# Patient Record
Sex: FEMALE | Race: WHITE | NOT HISPANIC OR LATINO | Employment: FULL TIME | URBAN - METROPOLITAN AREA
[De-identification: names, ages, dates, MRNs, and addresses within clinical notes are randomized per-mention and may not be internally consistent; named-entity substitution may affect disease eponyms.]

---

## 2020-02-10 ENCOUNTER — HOSPITAL ENCOUNTER (EMERGENCY)
Facility: MEDICAL CENTER | Age: 40
End: 2020-02-10
Attending: EMERGENCY MEDICINE
Payer: COMMERCIAL

## 2020-02-10 ENCOUNTER — APPOINTMENT (OUTPATIENT)
Dept: RADIOLOGY | Facility: MEDICAL CENTER | Age: 40
End: 2020-02-10
Attending: EMERGENCY MEDICINE
Payer: COMMERCIAL

## 2020-02-10 VITALS
OXYGEN SATURATION: 97 % | HEIGHT: 65 IN | TEMPERATURE: 97.7 F | DIASTOLIC BLOOD PRESSURE: 68 MMHG | RESPIRATION RATE: 18 BRPM | SYSTOLIC BLOOD PRESSURE: 113 MMHG | BODY MASS INDEX: 33.61 KG/M2 | HEART RATE: 88 BPM | WEIGHT: 201.72 LBS

## 2020-02-10 DIAGNOSIS — R10.84 GENERALIZED ABDOMINAL PAIN: ICD-10-CM

## 2020-02-10 LAB
ALBUMIN SERPL BCP-MCNC: 4 G/DL (ref 3.2–4.9)
ALBUMIN/GLOB SERPL: 1.7 G/DL
ALP SERPL-CCNC: 54 U/L (ref 30–99)
ALT SERPL-CCNC: 15 U/L (ref 2–50)
ANION GAP SERPL CALC-SCNC: 12 MMOL/L (ref 0–11.9)
AST SERPL-CCNC: 17 U/L (ref 12–45)
BASOPHILS # BLD AUTO: 0.5 % (ref 0–1.8)
BASOPHILS # BLD: 0.07 K/UL (ref 0–0.12)
BILIRUB SERPL-MCNC: <0.2 MG/DL (ref 0.1–1.5)
BUN SERPL-MCNC: 15 MG/DL (ref 8–22)
CALCIUM SERPL-MCNC: 8.5 MG/DL (ref 8.4–10.2)
CHLORIDE SERPL-SCNC: 105 MMOL/L (ref 96–112)
CO2 SERPL-SCNC: 21 MMOL/L (ref 20–33)
CREAT SERPL-MCNC: 0.69 MG/DL (ref 0.5–1.4)
EOSINOPHIL # BLD AUTO: 1.26 K/UL (ref 0–0.51)
EOSINOPHIL NFR BLD: 9.9 % (ref 0–6.9)
ERYTHROCYTE [DISTWIDTH] IN BLOOD BY AUTOMATED COUNT: 42 FL (ref 35.9–50)
GLOBULIN SER CALC-MCNC: 2.3 G/DL (ref 1.9–3.5)
GLUCOSE SERPL-MCNC: 104 MG/DL (ref 65–99)
HCT VFR BLD AUTO: 40.6 % (ref 37–47)
HGB BLD-MCNC: 13.5 G/DL (ref 12–16)
IMM GRANULOCYTES # BLD AUTO: 0.06 K/UL (ref 0–0.11)
IMM GRANULOCYTES NFR BLD AUTO: 0.5 % (ref 0–0.9)
LIPASE SERPL-CCNC: 34 U/L (ref 7–58)
LYMPHOCYTES # BLD AUTO: 2.39 K/UL (ref 1–4.8)
LYMPHOCYTES NFR BLD: 18.8 % (ref 22–41)
MCH RBC QN AUTO: 30.7 PG (ref 27–33)
MCHC RBC AUTO-ENTMCNC: 33.3 G/DL (ref 33.6–35)
MCV RBC AUTO: 92.3 FL (ref 81.4–97.8)
MONOCYTES # BLD AUTO: 0.61 K/UL (ref 0–0.85)
MONOCYTES NFR BLD AUTO: 4.8 % (ref 0–13.4)
NEUTROPHILS # BLD AUTO: 8.34 K/UL (ref 2–7.15)
NEUTROPHILS NFR BLD: 65.5 % (ref 44–72)
NRBC # BLD AUTO: 0 K/UL
NRBC BLD-RTO: 0 /100 WBC
PLATELET # BLD AUTO: 249 K/UL (ref 164–446)
PMV BLD AUTO: 10.5 FL (ref 9–12.9)
POTASSIUM SERPL-SCNC: 3.8 MMOL/L (ref 3.6–5.5)
PROT SERPL-MCNC: 6.3 G/DL (ref 6–8.2)
RBC # BLD AUTO: 4.4 M/UL (ref 4.2–5.4)
SODIUM SERPL-SCNC: 138 MMOL/L (ref 135–145)
WBC # BLD AUTO: 12.7 K/UL (ref 4.8–10.8)

## 2020-02-10 PROCEDURE — 99284 EMERGENCY DEPT VISIT MOD MDM: CPT

## 2020-02-10 PROCEDURE — 76705 ECHO EXAM OF ABDOMEN: CPT

## 2020-02-10 PROCEDURE — 36415 COLL VENOUS BLD VENIPUNCTURE: CPT

## 2020-02-10 PROCEDURE — 85025 COMPLETE CBC W/AUTO DIFF WBC: CPT

## 2020-02-10 PROCEDURE — 80053 COMPREHEN METABOLIC PANEL: CPT

## 2020-02-10 PROCEDURE — 83690 ASSAY OF LIPASE: CPT

## 2020-02-10 NOTE — ED TRIAGE NOTES
"Chief Complaint   Patient presents with   • Abdominal Pain     x1 week       /77   Pulse 93   Temp 36.5 °C (97.7 °F) (Temporal)   Ht 1.651 m (5' 5\")   Wt 91.5 kg (201 lb 11.5 oz)   LMP 01/27/2020   BMI 33.57 kg/m²     Pt denies nausea/vomiting  "

## 2020-02-10 NOTE — ED NOTES
Plan of care reviewed with pt and spouse. saline lock with blood draw at this time. States pain 2/10 currently. Aware of pending u/s-call light in reach, will notify staff of chgs in status

## 2020-02-10 NOTE — ED PROVIDER NOTES
ED Provider Note    CHIEF COMPLAINT  Chief Complaint   Patient presents with   • Abdominal Pain     x1 week       HPI  Tiarra Cross is a 39 y.o. female who presents to the emergency department with abdominal pain.  The patient localizes the pain to an area just above her bellybutton.  She says that she is had pain for the last week.  The pain seems to come and go.  Seems to be worse at night.  Not necessarily associated with ingestion of food.  She describes as a sharp stabbing crampy type pain.  Always located in the supraumbilical region.  No vomiting.  No diarrhea.  No constipation.    REVIEW OF SYSTEMS  See HPI for further details. All other systems are negative.     PAST MEDICAL HISTORY  History reviewed. No pertinent past medical history.    FAMILY HISTORY  History reviewed. No pertinent family history.    SOCIAL HISTORY  Social History     Socioeconomic History   • Marital status:      Spouse name: Not on file   • Number of children: Not on file   • Years of education: Not on file   • Highest education level: Not on file   Occupational History   • Not on file   Social Needs   • Financial resource strain: Not on file   • Food insecurity:     Worry: Not on file     Inability: Not on file   • Transportation needs:     Medical: Not on file     Non-medical: Not on file   Tobacco Use   • Smoking status: Never Smoker   • Smokeless tobacco: Never Used   Substance and Sexual Activity   • Alcohol use: Not Currently     Comment: occas.   • Drug use: No   • Sexual activity: Not on file   Lifestyle   • Physical activity:     Days per week: Not on file     Minutes per session: Not on file   • Stress: Not on file   Relationships   • Social connections:     Talks on phone: Not on file     Gets together: Not on file     Attends [employer] service: Not on file     Active member of club or organization: Not on file     Attends meetings of clubs or organizations: Not on file     Relationship status: Not on file   •  "Intimate partner violence:     Fear of current or ex partner: Not on file     Emotionally abused: Not on file     Physically abused: Not on file     Forced sexual activity: Not on file   Other Topics Concern   • Not on file   Social History Narrative   • Not on file       SURGICAL HISTORY  Past Surgical History:   Procedure Laterality Date   • DILATION AND CURETTAGE         CURRENT MEDICATIONS  Home Medications    **Home medications have not yet been reviewed for this encounter**         ALLERGIES  No Known Allergies    PHYSICAL EXAM  VITAL SIGNS: /68   Pulse 88   Temp 36.5 °C (97.7 °F) (Temporal)   Resp 18   Ht 1.651 m (5' 5\")   Wt 91.5 kg (201 lb 11.5 oz)   LMP 01/27/2020   SpO2 97%   BMI 33.57 kg/m²   Constitutional: Well developed, Well nourished, no acute distress, Non-toxic appearance.   HENT: Normocephalic, Atraumatic, Bilateral external ears normal, Oropharynx moist, No oral exudates, Nose normal.   Eyes: PERRL, EOMI, Conjunctiva normal, No discharge.   Neck: Normal range of motion, No tenderness, Supple, No stridor.   Lymphatic: No lymphadenopathy noted.   Cardiovascular: Normal heart rate, Normal rhythm, No murmurs, No rubs, No gallops.   Thorax & Lungs: Normal breath sounds, No respiratory distress, No wheezing, No chest tenderness.   Abdomen: Mild tenderness to palpation just above the umbilicus in the lower epigastrium.  No palpable masses.  No palpable hernias.  Negative De Los Santos sign.  No tenderness at McBurney's point.  Skin: Warm, Dry, No erythema, No rash.   Back: No tenderness, No CVA tenderness.   Extremities: Intact distal pulses, No edema, No tenderness, No cyanosis, No clubbing.   Neurologic: Alert & oriented x 3, Normal motor function, Normal sensory function, No focal deficits noted.       RADIOLOGY/PROCEDURES  US-RUQ   Final Result      Inhomogeneous liver is a nonspecific finding. There is no enlargement or mass identified      Upper normal pancreas duct caliber. Consider " correlation with pancreatic enzymes        Results for orders placed or performed during the hospital encounter of 02/10/20   CBC WITH DIFFERENTIAL   Result Value Ref Range    WBC 12.7 (H) 4.8 - 10.8 K/uL    RBC 4.40 4.20 - 5.40 M/uL    Hemoglobin 13.5 12.0 - 16.0 g/dL    Hematocrit 40.6 37.0 - 47.0 %    MCV 92.3 81.4 - 97.8 fL    MCH 30.7 27.0 - 33.0 pg    MCHC 33.3 (L) 33.6 - 35.0 g/dL    RDW 42.0 35.9 - 50.0 fL    Platelet Count 249 164 - 446 K/uL    MPV 10.5 9.0 - 12.9 fL    Neutrophils-Polys 65.50 44.00 - 72.00 %    Lymphocytes 18.80 (L) 22.00 - 41.00 %    Monocytes 4.80 0.00 - 13.40 %    Eosinophils 9.90 (H) 0.00 - 6.90 %    Basophils 0.50 0.00 - 1.80 %    Immature Granulocytes 0.50 0.00 - 0.90 %    Nucleated RBC 0.00 /100 WBC    Neutrophils (Absolute) 8.34 (H) 2.00 - 7.15 K/uL    Lymphs (Absolute) 2.39 1.00 - 4.80 K/uL    Monos (Absolute) 0.61 0.00 - 0.85 K/uL    Eos (Absolute) 1.26 (H) 0.00 - 0.51 K/uL    Baso (Absolute) 0.07 0.00 - 0.12 K/uL    Immature Granulocytes (abs) 0.06 0.00 - 0.11 K/uL    NRBC (Absolute) 0.00 K/uL   COMP METABOLIC PANEL   Result Value Ref Range    Sodium 138 135 - 145 mmol/L    Potassium 3.8 3.6 - 5.5 mmol/L    Chloride 105 96 - 112 mmol/L    Co2 21 20 - 33 mmol/L    Anion Gap 12.0 (H) 0.0 - 11.9    Glucose 104 (H) 65 - 99 mg/dL    Bun 15 8 - 22 mg/dL    Creatinine 0.69 0.50 - 1.40 mg/dL    Calcium 8.5 8.4 - 10.2 mg/dL    AST(SGOT) 17 12 - 45 U/L    ALT(SGPT) 15 2 - 50 U/L    Alkaline Phosphatase 54 30 - 99 U/L    Total Bilirubin <0.2 0.1 - 1.5 mg/dL    Albumin 4.0 3.2 - 4.9 g/dL    Total Protein 6.3 6.0 - 8.2 g/dL    Globulin 2.3 1.9 - 3.5 g/dL    A-G Ratio 1.7 g/dL   LIPASE   Result Value Ref Range    Lipase 34 7 - 58 U/L   ESTIMATED GFR   Result Value Ref Range    GFR If African American >60 >60 mL/min/1.73 m 2    GFR If Non African American >60 >60 mL/min/1.73 m 2         COURSE & MEDICAL DECISION MAKING  Pertinent Labs & Imaging studies reviewed. (See chart for details)    The  patient presents today with abdominal pain.  Fairly nonspecific physical examination.  Patient has had intermittent abdominal pain over the last week.  Seems to be improved at this time.  She is lost over 40 pounds as she is been trying to lose weight recently.  Patient may have gallstones causing biliary colic.  She does not have tenderness in the lower abdomen to make me concerned for appendicitis, or ovarian pathology.    Laboratory studies are remarkable for a mildly elevated white blood cell count.  Liver function tests are normal.  Lipase is normal ruling out pancreatitis.    Ultrasound is obtained.  This shows no evidence of cholecystitis or cholelithiasis.  Pancreatic duct is mildly dilated but the patient has a normal lipase.  On reexamination I reviewed all the patient's laboratory findings and ultrasound findings.  Repeat abdominal exam is entirely benign.  At this point I feel that her work-up is reassuring.  I do not feel that further work-up or CT scan would be beneficial at this time.  However I encouraged the patient to return to the emergency department should she develop any worsening pain, fevers, vomiting, or new symptoms.  The patient and her  verbalized understanding.  Discharged home in stable condition.    The patient will return for new or worsening symptoms and is stable at the time of discharge.    The patient is referred to a primary physician for blood pressure management, diabetic screening, and for all other preventative health concerns.    DISPOSITION:  Patient will be discharged home in stable condition.    FOLLOW UP:  Toro SANTOS M.D.  [address] [id number] [phone]    Schedule an appointment as soon as possible for a visit       [facility], Emergency Dept  [address] [id number] [phone]  Schedule an appointment as soon as possible for a visit         OUTPATIENT MEDICATIONS:  Discharge  Medication List as of 2/10/2020  4:39 PM            FINAL IMPRESSION  1. Generalized abdominal pain            Electronically signed by: Kenji Garza M.D., 2/10/2020 5:33 PM

## 2020-02-11 NOTE — ED NOTES
Pt rounding upon return from u/s. In no apparent distress, vs as charted, call light in reach, denies needs

## 2020-02-11 NOTE — ED NOTES
Patient educated on discharge instructions, verbalized understanding. IV removed, bleeding controlled. Patient ambulated steadily and independently from ED, belongings w patient.

## 2020-10-10 ENCOUNTER — HOSPITAL ENCOUNTER (EMERGENCY)
Facility: MEDICAL CENTER | Age: 40
End: 2020-10-10
Attending: EMERGENCY MEDICINE
Payer: COMMERCIAL

## 2020-10-10 VITALS
OXYGEN SATURATION: 93 % | WEIGHT: 200 LBS | RESPIRATION RATE: 50 BRPM | HEIGHT: 65 IN | BODY MASS INDEX: 33.32 KG/M2 | SYSTOLIC BLOOD PRESSURE: 131 MMHG | DIASTOLIC BLOOD PRESSURE: 76 MMHG | TEMPERATURE: 97.9 F | HEART RATE: 106 BPM

## 2020-10-10 DIAGNOSIS — T78.2XXA ANAPHYLAXIS, INITIAL ENCOUNTER: ICD-10-CM

## 2020-10-10 DIAGNOSIS — T78.40XA ALLERGIC REACTION, INITIAL ENCOUNTER: ICD-10-CM

## 2020-10-10 PROCEDURE — 96372 THER/PROPH/DIAG INJ SC/IM: CPT

## 2020-10-10 PROCEDURE — 99283 EMERGENCY DEPT VISIT LOW MDM: CPT

## 2020-10-10 PROCEDURE — 700111 HCHG RX REV CODE 636 W/ 250 OVERRIDE (IP): Performed by: EMERGENCY MEDICINE

## 2020-10-10 RX ORDER — EPINEPHRINE 1 MG/ML(1)
0.5 AMPUL (ML) INJECTION ONCE
Status: COMPLETED | OUTPATIENT
Start: 2020-10-10 | End: 2020-10-10

## 2020-10-10 RX ADMIN — EPINEPHRINE 0.5 MG: 1 INJECTION INTRAMUSCULAR; INTRAVENOUS; SUBCUTANEOUS at 20:46

## 2020-10-10 ASSESSMENT — FIBROSIS 4 INDEX: FIB4 SCORE: 0.74

## 2020-10-11 NOTE — ED TRIAGE NOTES
Pt presents with  from home for allergic reaction. Reports it began at 0400 this morning and progressively worsening over the day. Started on scalp. No having hives across body and facial swelling. Denies SOB or swelling in throat. Took 225 mg of benadryl over the day and 20 mg of prednisone today after speaking with her primary care. Pt a&Ox4 and ambulatory.     Patient masked. No respiratory symptoms, no recent travel, denies known COVID exposure.

## 2020-10-11 NOTE — ED NOTES
Pt presents to ED for allergic reaction to unknown substance. Pt reports that the reaction started at roughly 0400 with hives and over the day has progressively gotten worse. Pt reports taking benadryl and medications at home without relief. Airway intact and maintainable by pt. Denies any SOB. Pt notes that the swelling started to go into the facial region, and mouth/throat at roughly 1600 this afternoon. Prior to today, pt had no reaction, or no allergies to note. A/Ox4.  at bedside.

## 2020-10-11 NOTE — ED NOTES
Pt discharged home per provider in stable condition. Paperwork provided to pt via RN and discharge instructions went over with by RN - pt verbalized understanding of teaching with no questions or concerns and is A/Ox4, VSS. Paperwork in hand on discharge.    Prescription in hand no questions or concerns.

## 2020-10-11 NOTE — ED PROVIDER NOTES
ED Provider Note    CHIEF COMPLAINT  Chief Complaint   Patient presents with   • Allergic Reaction   • Facial Swelling       HPI  Tiarra Cross is a 40 y.o. female who presents with diffuse hives starting about an hour ago.  She states that she awoke this morning at around 4 AM with some itchiness to the scalp.  She noted some swelling to her right upper lip and around her right eye.  Since this morning, she has developed worsening itchiness and swelling especially in the face including bilateral eyes and perioral region.  No intraoral swelling.  No difficulty with breathing or speaking.  Has diffuse pruritus and diffuse hives.  Denies prior history of allergic reaction/anaphylaxis.  No new food or cosmetic exposures.  She states that she has just finished 1/5-day of Macrobid for treatment of a UTI.  Denies any urinary tract infection symptoms such as dysuria, flank pain, vomiting.  No fevers.  No shortness of breath.  No wheezing.    The patient has taken 225 mg of Benadryl throughout the day today without improvement.  She also took 20 mg of prednisone earlier today as well.  She has never received care for allergic reaction before in the past.    REVIEW OF SYSTEMS  See HPI for further details. All other systems are negative.     PAST MEDICAL HISTORY   Denies a from past medical history.    SOCIAL HISTORY  Social History     Tobacco Use   • Smoking status: Never Smoker   • Smokeless tobacco: Never Used   Substance and Sexual Activity   • Alcohol use: Not Currently     Comment: occas.   • Drug use: No   • Sexual activity: Not on file       SURGICAL HISTORY   has a past surgical history that includes dilation and curettage.    CURRENT MEDICATIONS  Home Medications     Reviewed by Nanda Ruff R.N. (Registered Nurse) on 10/10/20 at 2013  Med List Status: Not Addressed   Medication Last Dose Status   albuterol (VENTOLIN OR PROVENTIL) 108 (90 BASE) MCG/ACT AERS  Active   azithromycin (ZITHROMAX) 250 MG TABS   "Active   cyclobenzaprine (FLEXERIL) 10 MG TABS  Active   diclofenac EC (VOLTAREN) 75 MG TBEC  Active   Etonogestrel-Ethinyl Estradiol (NUVARING VA)  Active   hydrocodone/acetaminophen (NORCO)  MG TABS  Active   ofloxacin otic sol (FLOXIN OTIC) 0.3 % SOLN  Active   oxycodone-acetaminophen (PERCOCET) 5-325 MG TABS  Active   pregabalin (LYRICA) 300 MG capsule  Active                ALLERGIES  No Known Allergies    PHYSICAL EXAM  VITAL SIGNS: /80   Temp 36.6 °C (97.9 °F) (Temporal)   Resp 20   Ht 1.651 m (5' 5\")   Wt 90.7 kg (200 lb)   LMP 10/10/2020   SpO2 95%   BMI 33.28 kg/m²   Pulse ox interpretation: I interpret this pulse ox as normal.  Constitutional: Alert in no apparent distress.  HENT: No signs of trauma, Bilateral external ears normal, Nose normal.  Nicki-oral edema, bilateral periorbital edema  Eyes: Pupils are equal and reactive, Conjunctiva normal, Non-icteric.   Neck: Normal range of motion, No tenderness, Supple, No stridor.   Cardiovascular: Regular rate and rhythm.   Thorax & Lungs: Normal breath sounds, No respiratory distress, No wheezing, No chest tenderness.   Skin: Warm, Dry, No erythema, diffuse urticarial rash on extremities and torso.   Back: No bony tenderness, No CVA tenderness.   Extremities: Intact distal pulses, No edema, No tenderness, No cyanosis  Neurologic: Alert, No focal deficits noted.       DIAGNOSTIC STUDIES / PROCEDURES      COURSE & MEDICAL DECISION MAKING    Medications   EPINEPHrine injection 0.5 mg (has no administration in time range)       Pertinent Labs & Imaging studies reviewed. (See chart for details)  40 y.o. female presenting with facial swelling, diffuse pruritic urticaria.  The only new exposure is Macrobid over the past 5 days which she took for a UTI.  Denies any UTI symptoms.  No fevers, vomiting.  No abdominal pain.    Patient was given intramuscular epinephrine due to concerns for anaphylaxis given the patient's facial swelling especially in " "the perioral region.  No stridor or respiratory distress.  No wheezing..  She had several doses of Benadryl at home earlier today without much improvement.  She also took 1 dose of prednisone.    The patient denies prior history of anaphylaxis or significant allergic response in the past.    Following epinephrine administration, the patient had resolution of urticarial rash and pruritus.  Had improvement in facial swelling as well.  I reevaluated the patient at bedside and she states that she feels comfortable with discharge in her current state.  No wheezing or respiratory distress.  No change in her voice.    Patient is slightly tachycardic prior to discharge however I suspect that is related to the administration of epinephrine earlier.  She was discharged home with EpiPen's.  She states that she will follow-up with an allergy specialist.  She was instructed not to take her last dose of Macrobid for her urinary tract infection as I do suspect that this is the culprit exposure that caused her anaphylactic reaction.    The patient was instructed to follow-up with primary care physician for further management.  To return immediately for any worsening symptoms or development of any other concerning signs or symptoms. The patient verbalizes understanding in their own words.    /76   Pulse (!) 106   Temp 36.6 °C (97.9 °F) (Temporal)   Resp 19  Ht 1.651 m (5' 5\")   Wt 90.7 kg (200 lb)   LMP 10/10/2020   SpO2 93%   BMI 33.28 kg/m²     The patient was referred to primary care where they will receive further BP management.      [facility], Emergency Dept  [address]  [facility] [id number]  [phone]    As needed, If symptoms worsen    Primary care doctor    Schedule an appointment as soon as possible for a visit         FINAL IMPRESSION  1. Allergic reaction, initial encounter    2. Anaphylaxis, initial encounter            Electronically signed by: Dwayne Mendez M.D., " 10/10/2020 8:24 PM

## 2020-11-05 ENCOUNTER — TELEMEDICINE (OUTPATIENT)
Dept: MEDICAL GROUP | Facility: MEDICAL CENTER | Age: 40
End: 2020-11-05
Payer: COMMERCIAL

## 2020-11-05 VITALS — BODY MASS INDEX: 35.65 KG/M2 | HEIGHT: 65 IN | WEIGHT: 214 LBS | HEART RATE: 100 BPM

## 2020-11-05 DIAGNOSIS — Z71.3 ENCOUNTER FOR WEIGHT LOSS COUNSELING: ICD-10-CM

## 2020-11-05 DIAGNOSIS — T78.2XXD ANAPHYLAXIS, SUBSEQUENT ENCOUNTER: ICD-10-CM

## 2020-11-05 DIAGNOSIS — Z12.31 ENCOUNTER FOR SCREENING MAMMOGRAM FOR BREAST CANCER: ICD-10-CM

## 2020-11-05 DIAGNOSIS — J45.20 MILD INTERMITTENT ASTHMA WITHOUT COMPLICATION: ICD-10-CM

## 2020-11-05 DIAGNOSIS — Z00.00 PREVENTATIVE HEALTH CARE: ICD-10-CM

## 2020-11-05 PROBLEM — T78.2XXA ANAPHYLACTIC REACTION: Status: ACTIVE | Noted: 2020-11-05

## 2020-11-05 PROCEDURE — 99203 OFFICE O/P NEW LOW 30 MIN: CPT | Mod: 95,CR | Performed by: FAMILY MEDICINE

## 2020-11-05 RX ORDER — PHENTERMINE HYDROCHLORIDE 37.5 MG/1
37.5 TABLET ORAL
COMMUNITY

## 2020-11-05 RX ORDER — ALBUTEROL SULFATE 90 UG/1
2 AEROSOL, METERED RESPIRATORY (INHALATION) EVERY 6 HOURS PRN
Qty: 8.5 G | Refills: 11 | Status: SHIPPED | OUTPATIENT
Start: 2020-11-05

## 2020-11-05 ASSESSMENT — FIBROSIS 4 INDEX: FIB4 SCORE: 0.74

## 2020-11-05 ASSESSMENT — PATIENT HEALTH QUESTIONNAIRE - PHQ9: CLINICAL INTERPRETATION OF PHQ2 SCORE: 0

## 2020-11-05 NOTE — PROGRESS NOTES
Virtual Visit: Established Patient   This visit was conducted via Zoom using secure and encrypted videoconferencing technology. The patient was in a private location in the state of [place].    The patient's identity was confirmed and verbal consent was obtained for this virtual visit.    Subjective:   CC:   Chief Complaint   Patient presents with   • Establish Care   • Requesting Labs       Tiarra Cross is a 40 y.o. 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 No nausea or vomiting. No chest pains or shortness of breath.     Allergies   Allergen Reactions   • Macrobid [Kdc:Red Dye+Yellow Dye+Nitrofurantoin+Brilliant Blue Fcf] Anaphylaxis     Pt is unsure if she is allergic to macrobid but went to ER after taking this.        Current medicines (including changes today)  Current Outpatient Medications   Medication Sig Dispense Refill   • phentermine (ADIPEX-P) 37.5 MG tablet Take 37.5 mg by mouth every morning before breakfast.     • albuterol 108 (90 Base) MCG/ACT Aero Soln inhalation aerosol Inhale 2 Puffs by mouth every 6 hours as needed. Prn 8.5 g 11   • EPINEPHrine 0.3 MG/0.3ML Solution Prefilled Syringe 0.3 mg by Intramuscular route Once PRN (for anaphylaxis) for up to 1 dose. 2 Each 0     No current facility-administered medications for this visit.        Patient Active Problem List    Diagnosis Date Noted   • Encounter for weight loss counseling 11/05/2020   • Anaphylactic reaction 11/05/2020   • Mild intermittent asthma without complication 11/05/2020       No family history on file.    She  has no past medical history of Breast cancer (HCC).  She  has a past surgical history that includes dilation and  "curettage.       Objective:   Pulse 100 Comment: pt stated  Ht 1.651 m (5' 5\") Comment: pt stated  Wt 97.1 kg (214 lb) Comment: pt stated  LMP 10/10/2020   BMI 35.61 kg/m²     Physical Exam:  Constitutional: Alert, no distress, well-groomed.  Skin: No rashes in visible areas.  Eye: Round. Conjunctiva clear, lids normal. No icterus.   ENMT: Lips pink without lesions, good dentition, moist mucous membranes. Phonation normal.  Neck: No masses, no thyromegaly. Moves freely without pain.  Respiratory: Unlabored respiratory effort, no cough or audible wheeze  Psych: Alert and oriented x3, normal affect and mood.       Assessment and Plan:   The following treatment plan was discussed:     1. Encounter for weight loss counseling    2. Anaphylaxis, subsequent encounter    3. Mild intermittent asthma without complication  - albuterol 108 (90 Base) MCG/ACT Aero Soln inhalation aerosol; Inhale 2 Puffs by mouth every 6 hours as needed. Prn  Dispense: 8.5 g; Refill: 11    4. Preventative health care  - Basic Metabolic Panel; Future  - Lipid Profile; Future    5. Encounter for screening mammogram for breast cancer  - MA-SCREENING MAMMO BILAT W/CAD; Future    Other orders  - phentermine (ADIPEX-P) 37.5 MG tablet; Take 37.5 mg by mouth every morning before breakfast.    Problem List Items Addressed This Visit     Encounter for weight loss counseling     Taking phentermine x 9 mo  Starting weight 230   So 15 lbs down today   Had some regain   Going to a medispa for this   I briefly discuss safer long term options such as qsymia and contrave                Anaphylactic reaction     Woke up with angioedema full body hives   Consulted with allergist   Thought that macrobid was the cause     Had to take prednisone   Following up allergist                      Mild intermittent asthma without complication     History of, and has not needed to use in quite a while years              Relevant Medications    albuterol 108 (90 Base) " MCG/ACT Aero Soln inhalation aerosol      Other Visit Diagnoses     Preventative health care        Relevant Orders    Basic Metabolic Panel    Lipid Profile    Encounter for screening mammogram for breast cancer        Relevant Orders    MA-SCREENING MAMMO BILAT W/CAD              Follow-up: No follow-ups on file.

## 2020-11-05 NOTE — ASSESSMENT & PLAN NOTE
Woke up with angioedema full body hives   Consulted with allergist   Thought that macrobid was the cause     Had to take prednisone   Following up allergist

## 2020-11-05 NOTE — ASSESSMENT & PLAN NOTE
Taking phentermine x 9 mo  Starting weight 230   So 15 lbs down today   Had some regain   Going to a medispa for this   I briefly discuss safer long term options such as qsymia and contrave

## 2021-01-07 ENCOUNTER — HOSPITAL ENCOUNTER (OUTPATIENT)
Facility: MEDICAL CENTER | Age: 41
End: 2021-01-07
Attending: FAMILY MEDICINE
Payer: COMMERCIAL

## 2021-01-07 ENCOUNTER — OFFICE VISIT (OUTPATIENT)
Dept: MEDICAL GROUP | Facility: MEDICAL CENTER | Age: 41
End: 2021-01-07
Payer: COMMERCIAL

## 2021-01-07 VITALS
DIASTOLIC BLOOD PRESSURE: 78 MMHG | HEART RATE: 104 BPM | SYSTOLIC BLOOD PRESSURE: 116 MMHG | BODY MASS INDEX: 36.82 KG/M2 | WEIGHT: 221 LBS | TEMPERATURE: 98.3 F | RESPIRATION RATE: 12 BRPM | HEIGHT: 65 IN | OXYGEN SATURATION: 93 %

## 2021-01-07 DIAGNOSIS — R32 URINARY INCONTINENCE, UNSPECIFIED TYPE: ICD-10-CM

## 2021-01-07 DIAGNOSIS — Z01.419 WELL WOMAN EXAM WITH ROUTINE GYNECOLOGICAL EXAM: ICD-10-CM

## 2021-01-07 DIAGNOSIS — N81.4 UTERINE PROLAPSE: ICD-10-CM

## 2021-01-07 PROCEDURE — 87624 HPV HI-RISK TYP POOLED RSLT: CPT

## 2021-01-07 PROCEDURE — 99214 OFFICE O/P EST MOD 30 MIN: CPT | Performed by: FAMILY MEDICINE

## 2021-01-07 PROCEDURE — 88175 CYTOPATH C/V AUTO FLUID REDO: CPT

## 2021-01-07 ASSESSMENT — FIBROSIS 4 INDEX: FIB4 SCORE: 0.74

## 2021-01-07 ASSESSMENT — PATIENT HEALTH QUESTIONNAIRE - PHQ9: CLINICAL INTERPRETATION OF PHQ2 SCORE: 0

## 2021-01-08 DIAGNOSIS — Z01.419 WELL WOMAN EXAM WITH ROUTINE GYNECOLOGICAL EXAM: ICD-10-CM

## 2021-01-08 NOTE — PROGRESS NOTES
This medical record contains text that has been entered with the assistance of computer voice recognition and dictation software.  Therefore, it may contain unintended errors in text, spelling, punctuation, or grammar            Tiarra Cross is a 40 y.o. female here for well woman exam and physical exam    HPI:     Pinky hazel MACKAY   Has not had a chance to do labs yet   Requesting well woman exam and pap     Current Outpatient Medications   Medication Sig Dispense Refill   • phentermine (ADIPEX-P) 37.5 MG tablet Take 37.5 mg by mouth every morning before breakfast.     • albuterol 108 (90 Base) MCG/ACT Aero Soln inhalation aerosol Inhale 2 Puffs by mouth every 6 hours as needed. Prn 8.5 g 11   • EPINEPHrine 0.3 MG/0.3ML Solution Prefilled Syringe 0.3 mg by Intramuscular route Once PRN (for anaphylaxis) for up to 1 dose. 2 Each 0     No current facility-administered medications for this visit.      Patient Active Problem List    Diagnosis Date Noted   • Encounter for weight loss counseling 11/05/2020   • Anaphylactic reaction 11/05/2020   • Mild intermittent asthma without complication 11/05/2020     Past Surgical History:   Procedure Laterality Date   • DILATION AND CURETTAGE        Social History     Tobacco Use   • Smoking status: Never Smoker   • Smokeless tobacco: Never Used   Substance Use Topics   • Alcohol use: Not Currently     Comment: occas.   • Drug use: No     History reviewed. No pertinent family history.        ROS    CONSTITUTIONAL: no abnormal fatigue, wt loss or wt gain.    SKIN: pt has not identified any skin lesions that are growing or changing   BREAST: pt has not identified any concerning breast or axillary lumps or bumps  : no abnormal vaginal discharge, pelvic pain, dyspareunia or bloating  HEME: no abnormal uterine bleeding   GI: no diarrhea, no significant constipation     All other systems reviewed and are negative     Objective:     /78 (BP Location: Left arm, Patient  "Position: Sitting, BP Cuff Size: Adult)   Pulse (!) 104   Temp 36.8 °C (98.3 °F) (Temporal)   Resp 12   Ht 1.651 m (5' 5\")   Wt 100.2 kg (221 lb)   SpO2 93%  Body mass index is 36.78 kg/m².  Physical Exam:    Constitutional: Alert, no distress.  Skin: Warm, dry, good turgor, no rashes in visible areas.  Eye: Equal, round and reactive, conjunctiva clear, lids normal.  ENMT: Lips without lesions, good dentition, oropharynx clear.  Neck: Trachea midline, no masses, no thyromegaly. No cervical or supraclavicular lymphadenopathy.  Respiratory: Unlabored respiratory effort, lungs clear to auscultation, no wheezes, no ronchi.  Cardiovascular: Normal S1, S2, no murmur, no edema.  Abdomen: Soft, non-tender, no masses, no hepatosplenomegaly.  Psych: Alert and oriented x3, normal affect and mood.      Breast: bilateral breast examination performed, no concerning lumps or masses identified.  symmetric, no dimpling, no overlying skin changes, nipples wnl.        PELVIC and SPECULUM EXAM:  Normal external female genitalia.  Speculum: Vaginal cody wnl, no purulent discharge.  Cervix non friable.  Bimanual examination : Uterus size shape and consistency wnl.  No adnexal masses.  No Cervical motion tenderness             Assessment and Plan:   The following treatment plan was discussed      Problem List Items Addressed This Visit     None      Visit Diagnoses     Well woman exam with routine gynecological exam        Relevant Orders    THINPREP PAP WITH HPV    Urinary incontinence, unspecified type        Relevant Orders    REFERRAL TO UROGYNECOLOGY    Uterine prolapse        Relevant Orders    REFERRAL TO UROGYNECOLOGY          She endorses symptoms of uterine prolapse and bladder incont painful intercourse   She has been offered hysterectomy in the past and wants to consult with specialist again   Ref to [facility]     Age appropriate prev health care discussed     Amalia Real MD  [facility], Family Medicine "   [address]   [place] [address]  Phone: [phone]

## 2021-01-11 LAB
CYTOLOGY REG CYTOL: NORMAL
HPV HR 12 DNA CVX QL NAA+PROBE: NEGATIVE
HPV16 DNA SPEC QL NAA+PROBE: NEGATIVE
HPV18 DNA SPEC QL NAA+PROBE: NEGATIVE
SPECIMEN SOURCE: NORMAL

## 2021-01-12 ENCOUNTER — TELEPHONE (OUTPATIENT)
Dept: MEDICAL GROUP | Facility: MEDICAL CENTER | Age: 41
End: 2021-01-12

## 2021-01-12 NOTE — TELEPHONE ENCOUNTER
Phone Number Called: [phone] (home)     Call outcome: Did not leave a detailed message. Requested patient to call back.    Message: LVM for pt to give us a call back regarding her lab results.

## 2021-01-13 ENCOUNTER — TELEPHONE (OUTPATIENT)
Dept: MEDICAL GROUP | Facility: MEDICAL CENTER | Age: 41
End: 2021-01-13

## 2021-03-12 ENCOUNTER — HOSPITAL ENCOUNTER (OUTPATIENT)
Dept: LAB | Facility: MEDICAL CENTER | Age: 41
End: 2021-03-12
Attending: FAMILY MEDICINE
Payer: COMMERCIAL

## 2021-03-12 DIAGNOSIS — Z00.00 PREVENTATIVE HEALTH CARE: ICD-10-CM

## 2021-03-12 LAB
ANION GAP SERPL CALC-SCNC: 10 MMOL/L (ref 7–16)
BUN SERPL-MCNC: 18 MG/DL (ref 8–22)
CALCIUM SERPL-MCNC: 8.9 MG/DL (ref 8.5–10.5)
CHLORIDE SERPL-SCNC: 107 MMOL/L (ref 96–112)
CHOLEST SERPL-MCNC: 185 MG/DL (ref 100–199)
CO2 SERPL-SCNC: 23 MMOL/L (ref 20–33)
CREAT SERPL-MCNC: 0.79 MG/DL (ref 0.5–1.4)
FASTING STATUS PATIENT QL REPORTED: NORMAL
GLUCOSE SERPL-MCNC: 90 MG/DL (ref 65–99)
HDLC SERPL-MCNC: 37 MG/DL
LDLC SERPL CALC-MCNC: 113 MG/DL
POTASSIUM SERPL-SCNC: 4.2 MMOL/L (ref 3.6–5.5)
SODIUM SERPL-SCNC: 140 MMOL/L (ref 135–145)
TRIGL SERPL-MCNC: 177 MG/DL (ref 0–149)

## 2021-03-12 PROCEDURE — 36415 COLL VENOUS BLD VENIPUNCTURE: CPT

## 2021-03-12 PROCEDURE — 80048 BASIC METABOLIC PNL TOTAL CA: CPT

## 2021-03-12 PROCEDURE — 80061 LIPID PANEL: CPT

## 2024-11-01 ENCOUNTER — APPOINTMENT (OUTPATIENT)
Dept: RADIOLOGY | Facility: MEDICAL CENTER | Age: 44
End: 2024-11-01
Attending: FAMILY MEDICINE
Payer: COMMERCIAL

## 2024-11-01 DIAGNOSIS — Z12.31 ENCOUNTER FOR SCREENING MAMMOGRAM FOR BREAST CANCER: ICD-10-CM

## 2024-11-01 PROCEDURE — 77067 SCR MAMMO BI INCL CAD: CPT
